# Patient Record
Sex: MALE | Race: WHITE | NOT HISPANIC OR LATINO | Employment: PART TIME | URBAN - METROPOLITAN AREA
[De-identification: names, ages, dates, MRNs, and addresses within clinical notes are randomized per-mention and may not be internally consistent; named-entity substitution may affect disease eponyms.]

---

## 2023-05-10 ENCOUNTER — OFFICE VISIT (OUTPATIENT)
Dept: URGENT CARE | Facility: CLINIC | Age: 48
End: 2023-05-10
Payer: MEDICAID

## 2023-05-10 NOTE — PROGRESS NOTES
Subjective:      Patient ID: Mukund Martel is a 47 y.o. male.    Vitals:  vitals were not taken for this visit.     Chief Complaint: Knee Pain (Left)    Patient was bike riding 7 days ago and feels like he overworked his left knee and the next day went to have a massage and by Saturday he could not put any weight on it.    Knee Pain   ROS   Objective:     Physical Exam    Assessment:     No diagnosis found.    Plan:       There are no diagnoses linked to this encounter.

## 2023-10-09 ENCOUNTER — HOSPITAL ENCOUNTER (EMERGENCY)
Facility: HOSPITAL | Age: 48
Discharge: HOME | End: 2023-10-09
Attending: EMERGENCY MEDICINE

## 2023-10-09 VITALS
TEMPERATURE: 99 F | OXYGEN SATURATION: 96 % | HEART RATE: 100 BPM | RESPIRATION RATE: 18 BRPM | DIASTOLIC BLOOD PRESSURE: 79 MMHG | SYSTOLIC BLOOD PRESSURE: 146 MMHG

## 2023-10-09 DIAGNOSIS — T14.90XA TRAUMA: ICD-10-CM

## 2023-10-09 DIAGNOSIS — M11.20 CHONDROCALCINOSIS: ICD-10-CM

## 2023-10-09 DIAGNOSIS — R60.9 SWELLING: ICD-10-CM

## 2023-10-09 DIAGNOSIS — M25.562 ACUTE PAIN OF LEFT KNEE: Primary | ICD-10-CM

## 2023-10-09 PROCEDURE — 96372 THER/PROPH/DIAG INJ SC/IM: CPT

## 2023-10-09 PROCEDURE — 99285 EMERGENCY DEPT VISIT HI MDM: CPT | Mod: 25

## 2023-10-09 PROCEDURE — 63600175 PHARM REV CODE 636 W HCPCS

## 2023-10-09 RX ORDER — KETOROLAC TROMETHAMINE 30 MG/ML
15 INJECTION, SOLUTION INTRAMUSCULAR; INTRAVENOUS
Status: COMPLETED | OUTPATIENT
Start: 2023-10-09 | End: 2023-10-09

## 2023-10-09 RX ORDER — DICLOFENAC SODIUM 10 MG/G
2 GEL TOPICAL 4 TIMES DAILY
Qty: 20 G | Refills: 0 | Status: SHIPPED | OUTPATIENT
Start: 2023-10-09

## 2023-10-09 RX ORDER — NAPROXEN 500 MG/1
500 TABLET ORAL 2 TIMES DAILY WITH MEALS
Qty: 28 TABLET | Refills: 0 | Status: SHIPPED | OUTPATIENT
Start: 2023-10-09 | End: 2023-10-23

## 2023-10-09 RX ADMIN — KETOROLAC TROMETHAMINE 15 MG: 30 INJECTION INTRAMUSCULAR; INTRAVENOUS at 12:10

## 2023-10-09 NOTE — ED TRIAGE NOTES
"Pt arrived with c/o fall onto L knee while walking up stairs about 2 weeks ago.   PT states he knee became very swollen and he was unable to bear weight for 4 days.  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  Pt states, "My knee felt like it was 95% better, so I went to a football game yesterday.  I did some walking there.  I went up and down a few stairs.  This morning when I woke up, my knee was extremely swollen again.  My knee cap feels wobbly."  Pt denies any discoloration to L knee, denies any numbness or tingling.  Pt answering questions appropriately, speaking in complete sentences, respirations even and unlabored.  Aao x 4.    "

## 2023-10-09 NOTE — ED PROVIDER NOTES
Encounter Date: 10/9/2023       History     Chief Complaint   Patient presents with    Knee Pain     Left knee pain and swelling after having a fall on cruise 1 week ago     Mukund Martel is a 48 y.o. male  has a past medical history of Eugene's syndrome. presenting to the Emergency Department for left knee pain times 1-1/2 weeks.  Patient states he fell while walking upstairs in a cruise ship.  Patient reports taking 10 mg of prednisone twice a day for 10 days.  Patient states that relieved his symptoms.  He was pain-free for about 3 days until Friday even to a high school football game and was walking up a lot of stairs.  His pain and swelling returned 3 days ago.  No fever, nausea, vomiting.  No joint erythema.            Review of patient's allergies indicates:  No Known Allergies  Past Medical History:   Diagnosis Date    Eugene's syndrome      Past Surgical History:   Procedure Laterality Date    BACK SURGERY       History reviewed. No pertinent family history.     Review of Systems   Constitutional:  Negative for fever.   HENT:  Negative for sore throat.    Respiratory:  Negative for shortness of breath.    Cardiovascular:  Negative for chest pain.   Gastrointestinal:  Negative for nausea.   Genitourinary:  Negative for dysuria.   Musculoskeletal:  Positive for arthralgias and joint swelling. Negative for back pain.   Skin:  Negative for rash.   Neurological:  Negative for weakness.   Hematological:  Does not bruise/bleed easily.   All other systems reviewed and are negative.      Physical Exam     Initial Vitals [10/09/23 1100]   BP Pulse Resp Temp SpO2   (!) 146/79 100 18 98.6 °F (37 °C) 96 %      MAP       --         Physical Exam    Nursing note and vitals reviewed.  Constitutional: He appears well-developed and well-nourished. He is not diaphoretic. No distress.   HENT:   Head: Normocephalic and atraumatic.   Right Ear: Hearing, tympanic membrane and external ear normal.   Left Ear: Hearing, tympanic  membrane and external ear normal.   Nose: Nose normal.   Mouth/Throat: Uvula is midline, oropharynx is clear and moist and mucous membranes are normal.   Eyes: EOM and lids are normal. Pupils are equal, round, and reactive to light.   Neck:   Normal range of motion.  Cardiovascular:  Normal rate.           Pulmonary/Chest: Effort normal and breath sounds normal. No respiratory distress. He has no wheezes. He has no rhonchi.   Abdominal: Abdomen is soft. There is no abdominal tenderness.   Musculoskeletal:         General: Normal range of motion.      Cervical back: Normal range of motion. No rigidity.      Right knee: Normal.      Left knee: Swelling, effusion and bony tenderness (head of fibula) present. No erythema or ecchymosis. Tenderness (suprapatellar) present.        Legs:      Neurological: He is oriented to person, place, and time.   Skin: Skin is warm and dry. No rash noted.   Psychiatric: He has a normal mood and affect. His behavior is normal. Judgment and thought content normal.         ED Course   Procedures  Labs Reviewed - No data to display       Imaging Results              CT Knee Without Contrast Left (Final result)  Result time 10/09/23 15:01:04      Final result by Katy Díaz MD (10/09/23 15:01:04)                   Impression:      No acute fracture identified, previously seen cortical irregularity at the tibial spine represent degenerative change and osteophyte formation.    Sizable joint effusion.    Chondrocalcinosis, can be seen with CPPD.      Electronically signed by: Katy Díaz MD  Date:    10/09/2023  Time:    15:01               Narrative:    EXAMINATION:  CT KNEE WITHOUT CONTRAST LEFT    CLINICAL HISTORY:  Fracture, knee;    TECHNIQUE:  0.625-mm axial images of the lower extremity were obtained, coronal and sagittal images reformatted.    COMPARISON:  10/09/2023 radiograph    FINDINGS:  No  fracture identified.  Osseous spur at the tibial spine are  degenerative.    Tri compartment moderate osteophyte.    Significant chondrocalcinosis which can be associated with CPPD.    The PCL is well seen, the ACL is not well seen.    The extensor mechanism is intact.    There is a large joint effusion.    No popliteal cyst.    The subcutaneous soft tissue and musculature appear normal.                                       X-Ray Knee 3 View Left (Final result)  Result time 10/09/23 12:44:35      Final result by Katy Díaz MD (10/09/23 12:44:35)                   Impression:      Chondrocalcinosis can be seen with CPPD.    Small cortical irregularity at the tibial spine, could be degenerative, nondisplaced tiny fracture could have this appearance.    Joint effusion      Electronically signed by: Katy Díaz MD  Date:    10/09/2023  Time:    12:44               Narrative:    EXAMINATION:  XR KNEE 3 VIEW LEFT    CLINICAL HISTORY:  Injury, unspecified, initial encounter    TECHNIQUE:  AP, lateral, and Merchant views of the left knee were performed.    COMPARISON:  None    FINDINGS:  The tibial femoral joint space is preserved.  Chondrocalcinosis seen.    Small linear lucency/cortical irregularity involving the tibial spine, a small nondisplaced fracture cannot be excluded.    The femoral patellar joint space appears mildly narrowed.    There is a small to moderate-sized joint effusion.    The soft tissues appear normal.                                       Medications   ketorolac injection 15 mg (15 mg Intramuscular Given 10/9/23 1229)     Medical Decision Making  Urgent evaluation 48-year-old male presents to the emergency department for left knee pain following a fall 1.5 weeks ago.  Patient is mildly hypertensive, afebrile.  Patient is resting comfortably.  Ice has been applied to his knee.  On examination patient's left knee is swollen.  No erythema noted.  Patient does have tenderness to the fibular head.  Patient also tender suprapatellar.  Patient  has decreased range of motion.    My overall impression is Left knee pain and chondrocalcinosis. I have considered other differential diagnoses, but at this time do not suspect: Sprain/strain, fracture, contusion, dislocation, gout, septic arthritis      ED course:  As documented    Discharge Meds/Instructions: naproxen. Voltaren gel    There does not appear to be any indication for further emergent testing, observation, or hospitalization at this time.  Patient appears stable for and is comfortable with discharge home. The diagnosis, treatment plan, instructions for follow-up as well as ED return precautions were discussed. Advised to follow-up with PCP for outpatient follow-up in 2-3 days. Signs and symptoms that would warrant immediate return to ED were reviewed prior to discharge. All questions and concerns were asked, answered, and addressed. Patient expressed understanding and agreement with the plan.     This case was discussed with my attending, Dr. Simmons who is in agreement with my assessment and plan.     Amount and/or Complexity of Data Reviewed  Radiology: ordered. Decision-making details documented in ED Course.    Risk  Prescription drug management.              Attending Attestation:     Physician Attestation Statement for NP/PA:       Other NP/PA Attestation Additions:      Medical Decision Making: The patient was discussed with me.  The patient was not seen by me.  The patient will be discharged.             ED Course as of 10/09/23 1642   Mon Oct 09, 2023   1159 Patient presenting for evaluation of left knee pain.  Patient fell 1-1/2 weeks ago on a cruise.  Patient took 10 days of prednisone 10 mg.  Patient reports resolution of his pain for 3 or 4 days.  Patient states on Friday night he may have over did it going to high school football game.  Patient states swelling and pain returned to his knee.  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 [LH]    1300 X-Ray Knee 3 View Left  Presence of chondrocalcinosis.  Potential nondisplaced fracture at the tibial spine.  Will CT for further evaluation.   [LH]   1508 CT Knee Without Contrast Left  No acute fracture.  Chondrocalcinosis. [LH]      ED Course User Index  [LH] Dona Cruz PA-C                    Clinical Impression:   Final diagnoses:  [R60.9] Swelling  [T14.90XA] Trauma  [M25.562] Acute pain of left knee (Primary)  [M11.20] Chondrocalcinosis        ED Disposition Condition    Discharge Stable          ED Prescriptions       Medication Sig Dispense Start Date End Date Auth. Provider    naproxen (NAPROSYN) 500 MG tablet Take 1 tablet (500 mg total) by mouth 2 (two) times daily with meals. for 14 days 28 tablet 10/9/2023 10/23/2023 Dona Cruz PA-C    diclofenac sodium (VOLTAREN) 1 % Gel Apply 2 g topically 4 (four) times daily. 20 g 10/9/2023 -- Dona Cruz PA-C          Follow-up Information    None          Dona Cruz PA-C  10/09/23 1642       Anamaria Simmons MD  10/09/23 2034

## 2023-10-09 NOTE — DISCHARGE INSTRUCTIONS
Please follow-up with your new primary care provider or an orthopedic following your moved to [place].

## 2025-02-24 ENCOUNTER — OFFICE VISIT (OUTPATIENT)
Dept: URGENT CARE | Facility: CLINIC | Age: 50
End: 2025-02-24

## 2025-02-24 VITALS
SYSTOLIC BLOOD PRESSURE: 121 MMHG | RESPIRATION RATE: 18 BRPM | HEART RATE: 88 BPM | OXYGEN SATURATION: 98 % | TEMPERATURE: 98 F | DIASTOLIC BLOOD PRESSURE: 84 MMHG

## 2025-02-24 DIAGNOSIS — S83.92XA SPRAIN OF LEFT KNEE, UNSPECIFIED LIGAMENT, INITIAL ENCOUNTER: Primary | ICD-10-CM

## 2025-02-24 DIAGNOSIS — M25.462 KNEE EFFUSION, LEFT: ICD-10-CM

## 2025-02-24 PROCEDURE — 99213 OFFICE O/P EST LOW 20 MIN: CPT | Mod: S$GLB,,,

## 2025-02-24 RX ORDER — PREDNISONE 20 MG/1
20 TABLET ORAL 2 TIMES DAILY
Qty: 14 TABLET | Refills: 0 | Status: SHIPPED | OUTPATIENT
Start: 2025-02-24 | End: 2025-03-03

## 2025-02-24 NOTE — PROGRESS NOTES
Subjective:      Patient ID: Mukund Martel is a 49 y.o. male.    Vitals:  oral temperature is 97.8 °F (36.6 °C). His blood pressure is 121/84 and his pulse is 88. His respiration is 18 and oxygen saturation is 98%.     Chief Complaint: Knee Pain    49-year-old male presents to the clinic today with chief complaint of left knee pain and swelling. Symptoms started  3 weeks ago and have improved slightly.  He states he had a knee injury 09/2023 when he fell directly on knee on a cruise, he tore his meniscus and damaged his ACL. He states he did not have surgery for his injuries. He was in PT  for his knee which helped.   Patient has used compression to his knee.  Pain is due to the fluid in his knee, he describes it as achy.  Patient notes weight bearing makes it worse and rest improves symptoms. Denies any erythema, abrasions, ecchymosis, drainage, warmth, or deformities. Denies numbness or tingling. Denies radiation of pain.  Denies fever, body aches, chest pain, shortness of breath, abdominal pain, N/V/D, or rashes.     Knee Pain   The incident occurred more than 1 week ago.       Constitution: Negative for activity change, chills and fever.   Neck: Negative for neck pain.   Cardiovascular:  Negative for chest pain.   Respiratory:  Negative for shortness of breath.    Gastrointestinal:  Negative for abdominal pain, nausea, vomiting and diarrhea.   Musculoskeletal:  Positive for joint pain and joint swelling. Negative for pain, trauma, abnormal ROM of joint, arthritis, muscle cramps and muscle ache.   Skin:  Negative for rash, abrasion, erythema and bruising.   Neurological:  Negative for headaches.   Psychiatric/Behavioral:  Negative for nervous/anxious. The patient is not nervous/anxious.       Objective:     Physical Exam   Constitutional: He is oriented to person, place, and time. He appears well-developed.   HENT:   Head: Normocephalic and atraumatic. Head is without abrasion, without contusion and without  laceration.   Ears:   Right Ear: External ear normal.   Left Ear: External ear normal.   Nose: Nose normal.   Mouth/Throat: Oropharynx is clear and moist and mucous membranes are normal.   Eyes: Conjunctivae, EOM and lids are normal. Pupils are equal, round, and reactive to light.   Neck: Trachea normal and phonation normal. Neck supple.   Cardiovascular: Normal rate.   Pulmonary/Chest: Effort normal. No respiratory distress.   Musculoskeletal:      Left knee: He exhibits decreased range of motion (Ex: 0, F: 100), swelling, effusion and abnormal meniscus. He exhibits no ecchymosis, no deformity, no laceration, no erythema, normal alignment, no LCL laxity, normal patellar mobility, no bony tenderness and no MCL laxity. No tenderness found.     Instability Tests: anterior drawer test positive, posterior drawer test positive, left knee positive medial Jeannine test and left knee positive lateral Jeannine test     Comments: Negative calf squeeze. Graded 5/5 left knee flexion/extension.    Neurological: He is alert and oriented to person, place, and time.   Skin: Skin is warm, dry, intact and no rash. Capillary refill takes less than 2 seconds. not left kneeNo abrasion, No burn, No bruising, No erythema and No ecchymosis   Psychiatric: His speech is normal and behavior is normal. Judgment and thought content normal.   Nursing note and vitals reviewed.      Assessment:     1. Sprain of left knee, unspecified ligament, initial encounter    2. Knee effusion, left        Plan:       Sprain of left knee, unspecified ligament, initial encounter  -     predniSONE (DELTASONE) 20 MG tablet; Take 1 tablet (20 mg total) by mouth 2 (two) times daily. for 7 days  Dispense: 14 tablet; Refill: 0  -     Ambulatory referral/consult to Orthopedics  -     Ambulatory referral/consult to Internal Medicine    Knee effusion, left  -     predniSONE (DELTASONE) 20 MG tablet; Take 1 tablet (20 mg total) by mouth 2 (two) times daily. for 7 days   Dispense: 14 tablet; Refill: 0  -     Ambulatory referral/consult to Orthopedics  -     Ambulatory referral/consult to Internal Medicine

## 2025-02-24 NOTE — PATIENT INSTRUCTIONS
Please drink plenty of fluids.  Please get plenty of rest.  Please return here or go to the Emergency Department for any concerns or worsening of condition.  If you were prescribed a narcotic medication, do not drive or operate heavy equipment or machinery while taking these medications.  Take prednisone as directed.   If you were not prescribed an anti-inflammatory medication, and if you do not have any history of stomach/intestinal ulcers, or kidney disease, or are not taking a blood thinner such as Coumadin, Plavix, Pradaxa, Eloquis, or Xaralta for example, it is OK to take over the counter Ibuprofen or Advil or Motrin or Aleve as directed.  Do not take these medications on an empty stomach.  Rest, ice, compression and elevation to the affected joint or limb as needed.  Please follow up with your primary care doctor or specialist as needed.    If you  smoke, please stop smoking.